# Patient Record
Sex: FEMALE | Race: WHITE | NOT HISPANIC OR LATINO
[De-identification: names, ages, dates, MRNs, and addresses within clinical notes are randomized per-mention and may not be internally consistent; named-entity substitution may affect disease eponyms.]

---

## 2020-03-01 ENCOUNTER — RESULT REVIEW (OUTPATIENT)
Age: 70
End: 2020-03-01

## 2020-03-01 ENCOUNTER — INPATIENT (INPATIENT)
Facility: HOSPITAL | Age: 70
LOS: 2 days | Discharge: HOME HEALTH | DRG: 25 | End: 2020-03-04
Attending: NEUROLOGICAL SURGERY | Admitting: NEUROLOGICAL SURGERY
Payer: MEDICARE

## 2020-03-01 VITALS
SYSTOLIC BLOOD PRESSURE: 146 MMHG | TEMPERATURE: 99 F | HEIGHT: 65 IN | WEIGHT: 160.06 LBS | OXYGEN SATURATION: 98 % | DIASTOLIC BLOOD PRESSURE: 85 MMHG | HEART RATE: 77 BPM | RESPIRATION RATE: 16 BRPM

## 2020-03-01 DIAGNOSIS — D72.829 ELEVATED WHITE BLOOD CELL COUNT, UNSPECIFIED: ICD-10-CM

## 2020-03-01 DIAGNOSIS — I62.02 NONTRAUMATIC SUBACUTE SUBDURAL HEMORRHAGE: ICD-10-CM

## 2020-03-01 DIAGNOSIS — Z85.3 PERSONAL HISTORY OF MALIGNANT NEOPLASM OF BREAST: ICD-10-CM

## 2020-03-01 DIAGNOSIS — Z88.0 ALLERGY STATUS TO PENICILLIN: ICD-10-CM

## 2020-03-01 DIAGNOSIS — Z98.89 OTHER SPECIFIED POSTPROCEDURAL STATES: Chronic | ICD-10-CM

## 2020-03-01 DIAGNOSIS — G93.6 CEREBRAL EDEMA: ICD-10-CM

## 2020-03-01 DIAGNOSIS — G81.91 HEMIPLEGIA, UNSPECIFIED AFFECTING RIGHT DOMINANT SIDE: ICD-10-CM

## 2020-03-01 DIAGNOSIS — E89.2 POSTPROCEDURAL HYPOPARATHYROIDISM: ICD-10-CM

## 2020-03-01 DIAGNOSIS — Z98.890 OTHER SPECIFIED POSTPROCEDURAL STATES: Chronic | ICD-10-CM

## 2020-03-01 DIAGNOSIS — G93.5 COMPRESSION OF BRAIN: ICD-10-CM

## 2020-03-01 LAB
BASOPHILS # BLD AUTO: 0.05 K/UL — SIGNIFICANT CHANGE UP (ref 0–0.2)
BASOPHILS NFR BLD AUTO: 0.4 % — SIGNIFICANT CHANGE UP (ref 0–2)
EOSINOPHIL # BLD AUTO: 0.02 K/UL — SIGNIFICANT CHANGE UP (ref 0–0.5)
EOSINOPHIL NFR BLD AUTO: 0.2 % — SIGNIFICANT CHANGE UP (ref 0–6)
HCT VFR BLD CALC: 41 % — SIGNIFICANT CHANGE UP (ref 34.5–45)
HGB BLD-MCNC: 13.3 G/DL — SIGNIFICANT CHANGE UP (ref 11.5–15.5)
IMM GRANULOCYTES NFR BLD AUTO: 0.5 % — SIGNIFICANT CHANGE UP (ref 0–1.5)
LYMPHOCYTES # BLD AUTO: 1.25 K/UL — SIGNIFICANT CHANGE UP (ref 1–3.3)
LYMPHOCYTES # BLD AUTO: 10.9 % — LOW (ref 13–44)
MCHC RBC-ENTMCNC: 29.1 PG — SIGNIFICANT CHANGE UP (ref 27–34)
MCHC RBC-ENTMCNC: 32.4 GM/DL — SIGNIFICANT CHANGE UP (ref 32–36)
MCV RBC AUTO: 89.7 FL — SIGNIFICANT CHANGE UP (ref 80–100)
MONOCYTES # BLD AUTO: 0.76 K/UL — SIGNIFICANT CHANGE UP (ref 0–0.9)
MONOCYTES NFR BLD AUTO: 6.6 % — SIGNIFICANT CHANGE UP (ref 2–14)
NEUTROPHILS # BLD AUTO: 9.37 K/UL — HIGH (ref 1.8–7.4)
NEUTROPHILS NFR BLD AUTO: 81.4 % — HIGH (ref 43–77)
PLATELET # BLD AUTO: 367 K/UL — SIGNIFICANT CHANGE UP (ref 150–400)
RBC # BLD: 4.57 M/UL — SIGNIFICANT CHANGE UP (ref 3.8–5.2)
RBC # FLD: 13.2 % — SIGNIFICANT CHANGE UP (ref 10.3–14.5)
WBC # BLD: 11.51 K/UL — HIGH (ref 3.8–10.5)
WBC # FLD AUTO: 11.51 K/UL — HIGH (ref 3.8–10.5)

## 2020-03-01 PROCEDURE — 71045 X-RAY EXAM CHEST 1 VIEW: CPT | Mod: 26

## 2020-03-01 PROCEDURE — 70496 CT ANGIOGRAPHY HEAD: CPT | Mod: 26

## 2020-03-01 PROCEDURE — 70498 CT ANGIOGRAPHY NECK: CPT | Mod: 26

## 2020-03-01 PROCEDURE — 93010 ELECTROCARDIOGRAM REPORT: CPT

## 2020-03-01 RX ORDER — LABETALOL HCL 100 MG
10 TABLET ORAL ONCE
Refills: 0 | Status: COMPLETED | OUTPATIENT
Start: 2020-03-01 | End: 2020-03-01

## 2020-03-01 RX ADMIN — Medication 10 MILLIGRAM(S): at 23:34

## 2020-03-01 NOTE — ED PROVIDER NOTE - IMAGING STUDIES ADDITIONAL INFORMATION FREE TEXT
Discussed imaging with radiologist and he is able to evaluate C spine from CTA neck; no evidence of fracture.

## 2020-03-01 NOTE — ED PROVIDER NOTE - CLINICAL SUMMARY MEDICAL DECISION MAKING FREE TEXT BOX
Subacute subdural hemorrhage with acute components now with shift and right sided weakness from subuncal herniation.  Labetalol given IV to control pressures;  patient admitted to ICU under neurosurgery with plan to go to OR.

## 2020-03-01 NOTE — ED PROVIDER NOTE - ATTENDING CONTRIBUTION TO CARE
Attending Contribution to Care: I, Sultana Wynn, performed the initial face to face bedside interview with this patient regarding history of present illness, review of symptoms and relevant past medical, social and family history.  I completed an independent physical examination.  I was the initial provider who evaluated this patient. I have signed out the follow up of any pending tests (i.e. labs, radiological studies) to the ACP.  I have communicated the patient’s plan of care and disposition with the ACP.

## 2020-03-01 NOTE — ED PROVIDER NOTE - OBJECTIVE STATEMENT
PA note: Patient is a 69 year old female with PMHx of chronic sinusitis s/p sinus surgery in Dr. [facility] 10 days ago presents with sudden onset of right sided weakness for 1 day. Patient lives alone at home. Patient's sister tried to call her for 7 hours today but patient did not  her phone. Sister went to her house to check on her and found patient to be leaned over on her right side, non-verbal, laying on a recliner chair. Time of onset is unknown. Patient is not able to give history, obtained from sister at bedside. ~CARLITO Griffith PA note: Patient is a 69 year old female with PMHx of chronic sinusitis s/p sinus surgery in Dr[facility] 10 days ago presents with sudden onset of right sided weakness for 1 day. Patient lives alone at home. Patient's sister tried to call her for 7 hours today but patient did not  her phone. Sister went to her house to check on her and found patient to be leaned over on her right side, non-verbal, laying on a recliner chair. Time of onset is unknown. Patient is not able to give history, obtained from sister at bedside. ~CARLITO Griffith    Attending Note; Pt. is a 70 yo F with a hx of sinus issues, peptic ulcers, glaucoma, hx of parathyroidectomy, hysterectomy, breast cancer with lumpectomy X 2 presents BIB ambulance for AMS.  Sister states she last spoke to her yesterday.  Patients sister could not get in touch with her for about 7 hours today and went to her home.  Patient was slumped to the right, not speaking much and could move her right side very little.  Family called 911 as they suspected a stroke. Per sister, she spent time with her this week and patient also mentioned she fell while working in her barn about a week ago.  She was complaining of a dull headache this week.

## 2020-03-01 NOTE — ED ADULT TRIAGE NOTE - CHIEF COMPLAINT QUOTE
Pt brought in by EMS and sister for new AMS and weakness. Pt was last spoken to yesterday around 1400 by sister and was known to be well. Pt currently AOX1, Alert but confused. New weakness to right side. Pt does live alone. Sister notes she had sinus surgery last week. No code stroke called per Tianna Pt brought in by EMS and sister for new AMS and weakness. Pt was last spoken to yesterday around 1400 by sister and was known to be well. Pt currently AOX1, Alert but confused. New weakness to right side. Pt does live alone. Sister notes she had sinus surgery last week. No code stroke called per Ramos

## 2020-03-01 NOTE — ED PROVIDER NOTE - PROGRESS NOTE DETAILS
PA note: Due to the urgency of patient's S&S of an acute CVA, lab results were waived in order to expedite CTA head and neck. ~CARLITO Griffith JG:  PA discussed patient with me and CT showing bleed with shift.  Neurosurg PA contacted and Dr. Cisneros on for neurosurgery.  Labetalol given. JG:  CARLITO Calderon discussed patient with me and CT showing bleed with shift.  Neurosurg PA contacted and Dr. Cisneros on for neurosurgery.  Labetalol given. PA note, code stroke was canceled by charge nurse who told me that it was canceled by Dr. Ramos. I ordered stroke panel immediately after examining the patient. ~CARLITO Griffith PA note: Patient appears to have profound weakness on right side of her body. CT head shows right subacute subdural hematoma with right-to-left shift. ~CARLITO Griffith PA note: Due to the urgency of patient's S&S of an acute CVA, lab results were waived in order to expedite CTA head and neck. I personally called CT tech to immediately take patient to CT to r/o acute CVA. ~Kvng Ali, PA PA note: code stroke was canceled by charge nurse who told me that it was canceled by Dr. Ramos. I ordered stroke panel immediately after examining the patient. ~CARLITO Griffith

## 2020-03-01 NOTE — ED ADULT NURSE NOTE - OBJECTIVE STATEMENT
as above- sister also states that pt fell accidentally last week while in the barn after the sinus surgery- unknown if hit her head

## 2020-03-01 NOTE — ED PROVIDER NOTE - HEME LYMPH, MLM
No adenopathy or splenomegaly. No cervical or inguinal lymphadenopathy. No adenopathy or splenomegaly. No cervical or inguinal lymphadenopathy. ~CARLITO Griffith

## 2020-03-01 NOTE — ED PROVIDER NOTE - CPE EDP NEURO NORM
Before Your Surgery      Call your surgeon if there is any change in your health. This includes signs of a cold or flu (such as a sore throat, runny nose, cough, rash or fever).    Do not smoke, drink alcohol or take over the counter medicine (unless your surgeon or primary care doctor tells you to) for the 24 hours before and after surgery.    If you take prescribed drugs: Follow your doctor s orders about which medicines to take and which to stop until after surgery.    Eating and drinking prior to surgery: follow the instructions from your surgeon    Take a shower or bath the night before surgery. Use the soap your surgeon gave you to gently clean your skin. If you do not have soap from your surgeon, use your regular soap. Do not shave or scrub the surgery site.  Wear clean pajamas and have clean sheets on your bed.    normal... - - -

## 2020-03-01 NOTE — ED PROVIDER NOTE - ENMT, MLM
Airway patent, Nasal mucosa clear. Mouth with normal mucosa. Throat has no vesicles, no oropharyngeal exudates and uvula is midline. Airway patent, Nasal mucosa clear. Mouth with normal mucosa. Throat is clear

## 2020-03-01 NOTE — ED ADULT NURSE NOTE - CHIEF COMPLAINT QUOTE
Pt brought in by EMS and sister for new AMS and weakness. Pt was last spoken to yesterday around 1400 by sister and was known to be well. Pt currently AOX1, Alert but confused. New weakness to right side. Pt does live alone. Sister notes she had sinus surgery last week. No code stroke called per Tianna

## 2020-03-01 NOTE — ED ADULT NURSE REASSESSMENT NOTE - NS ED NURSE REASSESS COMMENT FT1
BIBA for r/o stroke- pt last seen normal by family yesterday at 2pm- recently had sinus surgery 9 days ago. Pt arrived nonverbal, slight movements to BUE and +reflex to both feet. Pt p;aced pn CM, SR on EKG, pt taken to CT. Family at bedside.

## 2020-03-01 NOTE — ED PROVIDER NOTE - UNABLE TO OBTAIN
Severe Illness/Injury Patient with signs of CVA/TIA, time of onset unknown. History obtained from sister.

## 2020-03-02 DIAGNOSIS — I62.00 NONTRAUMATIC SUBDURAL HEMORRHAGE, UNSPECIFIED: ICD-10-CM

## 2020-03-02 LAB
ALBUMIN SERPL ELPH-MCNC: 3.4 G/DL — SIGNIFICANT CHANGE UP (ref 3.3–5)
ALP SERPL-CCNC: 88 U/L — SIGNIFICANT CHANGE UP (ref 40–120)
ALT FLD-CCNC: 18 U/L — SIGNIFICANT CHANGE UP (ref 12–78)
ANION GAP SERPL CALC-SCNC: 5 MMOL/L — SIGNIFICANT CHANGE UP (ref 5–17)
ANION GAP SERPL CALC-SCNC: 6 MMOL/L — SIGNIFICANT CHANGE UP (ref 5–17)
APTT BLD: 30.8 SEC — SIGNIFICANT CHANGE UP (ref 27.5–36.3)
AST SERPL-CCNC: 10 U/L — LOW (ref 15–37)
BILIRUB SERPL-MCNC: 1.2 MG/DL — SIGNIFICANT CHANGE UP (ref 0.2–1.2)
BUN SERPL-MCNC: 15 MG/DL — SIGNIFICANT CHANGE UP (ref 7–23)
BUN SERPL-MCNC: 16 MG/DL — SIGNIFICANT CHANGE UP (ref 7–23)
CALCIUM SERPL-MCNC: 8.4 MG/DL — LOW (ref 8.5–10.1)
CALCIUM SERPL-MCNC: 8.7 MG/DL — SIGNIFICANT CHANGE UP (ref 8.5–10.1)
CHLORIDE SERPL-SCNC: 105 MMOL/L — SIGNIFICANT CHANGE UP (ref 96–108)
CHLORIDE SERPL-SCNC: 108 MMOL/L — SIGNIFICANT CHANGE UP (ref 96–108)
CO2 SERPL-SCNC: 26 MMOL/L — SIGNIFICANT CHANGE UP (ref 22–31)
CO2 SERPL-SCNC: 26 MMOL/L — SIGNIFICANT CHANGE UP (ref 22–31)
CREAT SERPL-MCNC: 0.59 MG/DL — SIGNIFICANT CHANGE UP (ref 0.5–1.3)
CREAT SERPL-MCNC: 0.66 MG/DL — SIGNIFICANT CHANGE UP (ref 0.5–1.3)
GLUCOSE SERPL-MCNC: 115 MG/DL — HIGH (ref 70–99)
GLUCOSE SERPL-MCNC: 120 MG/DL — HIGH (ref 70–99)
HBA1C BLD-MCNC: 5.6 % — SIGNIFICANT CHANGE UP (ref 4–5.6)
HCT VFR BLD CALC: 40.2 % — SIGNIFICANT CHANGE UP (ref 34.5–45)
HCV AB S/CO SERPL IA: 0.19 S/CO — SIGNIFICANT CHANGE UP (ref 0–0.99)
HCV AB SERPL-IMP: SIGNIFICANT CHANGE UP
HGB BLD-MCNC: 12.9 G/DL — SIGNIFICANT CHANGE UP (ref 11.5–15.5)
INR BLD: 1.08 RATIO — SIGNIFICANT CHANGE UP (ref 0.88–1.16)
LACTATE SERPL-SCNC: 1.1 MMOL/L — SIGNIFICANT CHANGE UP (ref 0.7–2)
MAGNESIUM SERPL-MCNC: 2.1 MG/DL — SIGNIFICANT CHANGE UP (ref 1.6–2.6)
MCHC RBC-ENTMCNC: 29.3 PG — SIGNIFICANT CHANGE UP (ref 27–34)
MCHC RBC-ENTMCNC: 32.1 GM/DL — SIGNIFICANT CHANGE UP (ref 32–36)
MCV RBC AUTO: 91.2 FL — SIGNIFICANT CHANGE UP (ref 80–100)
PHOSPHATE SERPL-MCNC: 3.4 MG/DL — SIGNIFICANT CHANGE UP (ref 2.5–4.5)
PLATELET # BLD AUTO: 345 K/UL — SIGNIFICANT CHANGE UP (ref 150–400)
POTASSIUM SERPL-MCNC: 3.6 MMOL/L — SIGNIFICANT CHANGE UP (ref 3.5–5.3)
POTASSIUM SERPL-MCNC: 3.9 MMOL/L — SIGNIFICANT CHANGE UP (ref 3.5–5.3)
POTASSIUM SERPL-SCNC: 3.6 MMOL/L — SIGNIFICANT CHANGE UP (ref 3.5–5.3)
POTASSIUM SERPL-SCNC: 3.9 MMOL/L — SIGNIFICANT CHANGE UP (ref 3.5–5.3)
PROT SERPL-MCNC: 6.9 GM/DL — SIGNIFICANT CHANGE UP (ref 6–8.3)
PROTHROM AB SERPL-ACNC: 12 SEC — SIGNIFICANT CHANGE UP (ref 10–12.9)
RBC # BLD: 4.41 M/UL — SIGNIFICANT CHANGE UP (ref 3.8–5.2)
RBC # FLD: 13.3 % — SIGNIFICANT CHANGE UP (ref 10.3–14.5)
SODIUM SERPL-SCNC: 137 MMOL/L — SIGNIFICANT CHANGE UP (ref 135–145)
SODIUM SERPL-SCNC: 139 MMOL/L — SIGNIFICANT CHANGE UP (ref 135–145)
TROPONIN I SERPL-MCNC: <0.015 NG/ML — SIGNIFICANT CHANGE UP (ref 0.01–0.04)
TSH SERPL-MCNC: 1.09 UU/ML — SIGNIFICANT CHANGE UP (ref 0.34–4.82)
WBC # BLD: 13.88 K/UL — HIGH (ref 3.8–10.5)
WBC # FLD AUTO: 13.88 K/UL — HIGH (ref 3.8–10.5)

## 2020-03-02 PROCEDURE — 99291 CRITICAL CARE FIRST HOUR: CPT

## 2020-03-02 PROCEDURE — C1713: CPT

## 2020-03-02 PROCEDURE — 83735 ASSAY OF MAGNESIUM: CPT

## 2020-03-02 PROCEDURE — 83036 HEMOGLOBIN GLYCOSYLATED A1C: CPT

## 2020-03-02 PROCEDURE — 70450 CT HEAD/BRAIN W/O DYE: CPT | Mod: 26

## 2020-03-02 PROCEDURE — 86920 COMPATIBILITY TEST SPIN: CPT

## 2020-03-02 PROCEDURE — 84100 ASSAY OF PHOSPHORUS: CPT

## 2020-03-02 PROCEDURE — 72125 CT NECK SPINE W/O DYE: CPT

## 2020-03-02 PROCEDURE — 92610 EVALUATE SWALLOWING FUNCTION: CPT | Mod: GN

## 2020-03-02 PROCEDURE — 70450 CT HEAD/BRAIN W/O DYE: CPT

## 2020-03-02 PROCEDURE — 93005 ELECTROCARDIOGRAM TRACING: CPT

## 2020-03-02 PROCEDURE — 92523 SPEECH SOUND LANG COMPREHEN: CPT | Mod: GN

## 2020-03-02 PROCEDURE — 97116 GAIT TRAINING THERAPY: CPT | Mod: GP

## 2020-03-02 PROCEDURE — 84443 ASSAY THYROID STIM HORMONE: CPT

## 2020-03-02 PROCEDURE — 85027 COMPLETE CBC AUTOMATED: CPT

## 2020-03-02 PROCEDURE — C1763: CPT

## 2020-03-02 PROCEDURE — 80048 BASIC METABOLIC PNL TOTAL CA: CPT

## 2020-03-02 PROCEDURE — 88304 TISSUE EXAM BY PATHOLOGIST: CPT

## 2020-03-02 PROCEDURE — 88304 TISSUE EXAM BY PATHOLOGIST: CPT | Mod: 26

## 2020-03-02 PROCEDURE — 72125 CT NECK SPINE W/O DYE: CPT | Mod: 26

## 2020-03-02 PROCEDURE — 86803 HEPATITIS C AB TEST: CPT

## 2020-03-02 PROCEDURE — 93010 ELECTROCARDIOGRAM REPORT: CPT

## 2020-03-02 PROCEDURE — 36415 COLL VENOUS BLD VENIPUNCTURE: CPT

## 2020-03-02 PROCEDURE — 97162 PT EVAL MOD COMPLEX 30 MIN: CPT | Mod: GP

## 2020-03-02 PROCEDURE — 97530 THERAPEUTIC ACTIVITIES: CPT | Mod: GP

## 2020-03-02 RX ORDER — FOLIC ACID 0.8 MG
1 TABLET ORAL DAILY
Refills: 0 | Status: DISCONTINUED | OUTPATIENT
Start: 2020-03-02 | End: 2020-03-04

## 2020-03-02 RX ORDER — SODIUM CHLORIDE 9 MG/ML
1000 INJECTION INTRAMUSCULAR; INTRAVENOUS; SUBCUTANEOUS
Refills: 0 | Status: DISCONTINUED | OUTPATIENT
Start: 2020-03-02 | End: 2020-03-02

## 2020-03-02 RX ORDER — ACETAMINOPHEN 500 MG
1000 TABLET ORAL EVERY 8 HOURS
Refills: 0 | Status: COMPLETED | OUTPATIENT
Start: 2020-03-02 | End: 2020-03-02

## 2020-03-02 RX ORDER — VANCOMYCIN HCL 1 G
750 VIAL (EA) INTRAVENOUS EVERY 12 HOURS
Refills: 0 | Status: DISCONTINUED | OUTPATIENT
Start: 2020-03-02 | End: 2020-03-03

## 2020-03-02 RX ORDER — LABETALOL HCL 100 MG
10 TABLET ORAL
Refills: 0 | Status: DISCONTINUED | OUTPATIENT
Start: 2020-03-02 | End: 2020-03-04

## 2020-03-02 RX ORDER — LEVETIRACETAM 250 MG/1
500 TABLET, FILM COATED ORAL ONCE
Refills: 0 | Status: DISCONTINUED | OUTPATIENT
Start: 2020-03-02 | End: 2020-03-02

## 2020-03-02 RX ORDER — VANCOMYCIN HCL 1 G
VIAL (EA) INTRAVENOUS
Refills: 0 | Status: DISCONTINUED | OUTPATIENT
Start: 2020-03-02 | End: 2020-03-02

## 2020-03-02 RX ORDER — LEVETIRACETAM 250 MG/1
500 TABLET, FILM COATED ORAL EVERY 12 HOURS
Refills: 0 | Status: DISCONTINUED | OUTPATIENT
Start: 2020-03-02 | End: 2020-03-02

## 2020-03-02 RX ORDER — VANCOMYCIN HCL 1 G
1000 VIAL (EA) INTRAVENOUS EVERY 12 HOURS
Refills: 0 | Status: DISCONTINUED | OUTPATIENT
Start: 2020-03-02 | End: 2020-03-02

## 2020-03-02 RX ORDER — POTASSIUM CHLORIDE 20 MEQ
10 PACKET (EA) ORAL
Refills: 0 | Status: COMPLETED | OUTPATIENT
Start: 2020-03-02 | End: 2020-03-02

## 2020-03-02 RX ORDER — FENTANYL CITRATE 50 UG/ML
25 INJECTION INTRAVENOUS
Refills: 0 | Status: DISCONTINUED | OUTPATIENT
Start: 2020-03-02 | End: 2020-03-03

## 2020-03-02 RX ORDER — SENNA PLUS 8.6 MG/1
2 TABLET ORAL AT BEDTIME
Refills: 0 | Status: DISCONTINUED | OUTPATIENT
Start: 2020-03-02 | End: 2020-03-04

## 2020-03-02 RX ORDER — PANTOPRAZOLE SODIUM 20 MG/1
40 TABLET, DELAYED RELEASE ORAL
Refills: 0 | Status: DISCONTINUED | OUTPATIENT
Start: 2020-03-02 | End: 2020-03-04

## 2020-03-02 RX ORDER — FENTANYL CITRATE 50 UG/ML
25 INJECTION INTRAVENOUS
Refills: 0 | Status: DISCONTINUED | OUTPATIENT
Start: 2020-03-02 | End: 2020-03-02

## 2020-03-02 RX ORDER — OXYCODONE HYDROCHLORIDE 5 MG/1
10 TABLET ORAL EVERY 4 HOURS
Refills: 0 | Status: DISCONTINUED | OUTPATIENT
Start: 2020-03-02 | End: 2020-03-04

## 2020-03-02 RX ORDER — VANCOMYCIN HCL 1 G
1000 VIAL (EA) INTRAVENOUS ONCE
Refills: 0 | Status: DISCONTINUED | OUTPATIENT
Start: 2020-03-02 | End: 2020-03-02

## 2020-03-02 RX ORDER — LEVETIRACETAM 250 MG/1
750 TABLET, FILM COATED ORAL EVERY 12 HOURS
Refills: 0 | Status: DISCONTINUED | OUTPATIENT
Start: 2020-03-02 | End: 2020-03-02

## 2020-03-02 RX ORDER — POLYETHYLENE GLYCOL 3350 17 G/17G
17 POWDER, FOR SOLUTION ORAL DAILY
Refills: 0 | Status: DISCONTINUED | OUTPATIENT
Start: 2020-03-02 | End: 2020-03-04

## 2020-03-02 RX ORDER — LABETALOL HCL 100 MG
10 TABLET ORAL
Refills: 0 | Status: DISCONTINUED | OUTPATIENT
Start: 2020-03-02 | End: 2020-03-02

## 2020-03-02 RX ORDER — ONDANSETRON 8 MG/1
4 TABLET, FILM COATED ORAL ONCE
Refills: 0 | Status: DISCONTINUED | OUTPATIENT
Start: 2020-03-02 | End: 2020-03-04

## 2020-03-02 RX ORDER — ACETAMINOPHEN 500 MG
650 TABLET ORAL EVERY 6 HOURS
Refills: 0 | Status: DISCONTINUED | OUTPATIENT
Start: 2020-03-02 | End: 2020-03-04

## 2020-03-02 RX ORDER — ACETAMINOPHEN 500 MG
1000 TABLET ORAL ONCE
Refills: 0 | Status: DISCONTINUED | OUTPATIENT
Start: 2020-03-02 | End: 2020-03-04

## 2020-03-02 RX ORDER — ONDANSETRON 8 MG/1
4 TABLET, FILM COATED ORAL EVERY 4 HOURS
Refills: 0 | Status: DISCONTINUED | OUTPATIENT
Start: 2020-03-02 | End: 2020-03-04

## 2020-03-02 RX ORDER — ACETAMINOPHEN 500 MG
1000 TABLET ORAL ONCE
Refills: 0 | Status: COMPLETED | OUTPATIENT
Start: 2020-03-02 | End: 2020-03-02

## 2020-03-02 RX ORDER — LEVETIRACETAM 250 MG/1
750 TABLET, FILM COATED ORAL EVERY 12 HOURS
Refills: 0 | Status: DISCONTINUED | OUTPATIENT
Start: 2020-03-02 | End: 2020-03-03

## 2020-03-02 RX ADMIN — LEVETIRACETAM 400 MILLIGRAM(S): 250 TABLET, FILM COATED ORAL at 22:13

## 2020-03-02 RX ADMIN — Medication 10 MILLIGRAM(S): at 00:33

## 2020-03-02 RX ADMIN — Medication 250 MILLIGRAM(S): at 23:16

## 2020-03-02 RX ADMIN — Medication 650 MILLIGRAM(S): at 22:34

## 2020-03-02 RX ADMIN — SENNA PLUS 2 TABLET(S): 8.6 TABLET ORAL at 22:12

## 2020-03-02 RX ADMIN — Medication 1 TABLET(S): at 13:43

## 2020-03-02 RX ADMIN — LEVETIRACETAM 400 MILLIGRAM(S): 250 TABLET, FILM COATED ORAL at 10:44

## 2020-03-02 RX ADMIN — FENTANYL CITRATE 25 MICROGRAM(S): 50 INJECTION INTRAVENOUS at 15:10

## 2020-03-02 RX ADMIN — Medication 1 MILLIGRAM(S): at 13:43

## 2020-03-02 RX ADMIN — POLYETHYLENE GLYCOL 3350 17 GRAM(S): 17 POWDER, FOR SOLUTION ORAL at 13:43

## 2020-03-02 RX ADMIN — Medication 650 MILLIGRAM(S): at 23:00

## 2020-03-02 RX ADMIN — SODIUM CHLORIDE 75 MILLILITER(S): 9 INJECTION INTRAMUSCULAR; INTRAVENOUS; SUBCUTANEOUS at 04:36

## 2020-03-02 RX ADMIN — Medication 1000 MILLIGRAM(S): at 10:42

## 2020-03-02 RX ADMIN — Medication 250 MILLIGRAM(S): at 12:02

## 2020-03-02 RX ADMIN — LEVETIRACETAM 400 MILLIGRAM(S): 250 TABLET, FILM COATED ORAL at 00:52

## 2020-03-02 RX ADMIN — FENTANYL CITRATE 25 MICROGRAM(S): 50 INJECTION INTRAVENOUS at 14:53

## 2020-03-02 RX ADMIN — Medication 1000 MILLIGRAM(S): at 06:40

## 2020-03-02 RX ADMIN — Medication 100 MILLIEQUIVALENT(S): at 04:37

## 2020-03-02 RX ADMIN — Medication 400 MILLIGRAM(S): at 09:52

## 2020-03-02 RX ADMIN — Medication 400 MILLIGRAM(S): at 05:33

## 2020-03-02 NOTE — SWALLOW BEDSIDE ASSESSMENT ADULT - COMMENTS
The pt was found at home slumped in a chair in a poorly responsive state. Imaging of brain in hospital is notable for a right SDH with shift and she is status post evacuation/craniotomy. Patient has been alert post surgery. She has an underlying history of recently undergoing sinus surgery, peptic ulcer disease, HLD, glaucoma, prior parathyroidectomy, past breast cancer s/p 2 lumpectomies, and previous hysterectomy. The pt was admitted to  after being found at home slumped in a chair in a poorly responsive state. Imaging of brain in hospital is notable for a right SDH with shift and she is status post evacuation/craniotomy. Patient has been alert post surgery. She has an underlying history of recently undergoing sinus surgery, peptic ulcer disease, HLD, glaucoma, prior parathyroidectomy, past breast cancer s/p 2 lumpectomies, and previous hysterectomy.

## 2020-03-02 NOTE — H&P ADULT - HISTORY OF PRESENT ILLNESS
Neurosurgery:    69F with PMHx of  sinus issues, peptic ulcers, glaucoma, hx of parathyroidectomy, hysterectomy, breast cancer with lumpectomy X 2 presents BIB ambulance for AMS and weakness / slumping over in chair when found in her home.  Pt is s/p sinus surgery in Dr. [facility] 10 days ago - family reports possible fall post ENT surgery but unsure.  Patient lives alone at home. Patient's sister tried to call her for 7 hours today but patient did not  her phone. Sister went to her house to check on her at 10pm and found patient to be leaned over on her right side, non-verbal, laying on a recliner chair. Time of onset is unknown. Patient is not able to give history, obtained from sister at bedside. Family called 911 as they suspected a stroke. Per sister, she spent time with her this week and patient also mentioned she fell while working in her barn about a week ago.  She was complaining of a dull headache this week.  CTH revealed Right SDH subacute in imaging characteristics.  Family denies blood thinner use or NSAID / ASA use.      PAST MEDICAL & SURGICAL HISTORY:  Sinus disease  S/P sinus surgery    Allergies:  penicillin (Unknown)  penicillins (Unknown)      MEDICATIONS  (STANDING):  levETIRAcetam 500 milliGRAM(s) Oral every 12 hours  potassium chloride  10 mEq/100 mL IVPB 10 milliEquivalent(s) IV Intermittent every 1 hour  sodium chloride 0.9%. 1000 milliLiter(s) (75 mL/Hr) IV Continuous <Continuous> Total Critical Care Time spent > 62 minutes in evaluating patient, medical record, imaging studies and implementing neuro protective measures to avoid further neurological insult, injury or collapse.  All above recommendations will potentially avoid hemorrhage expansion  / brain compression / cerebral edema / and or eliptogenic activity.      Neurosurgery:    HPI    69F with PMHx of  sinus issues, peptic ulcers, glaucoma, hx of parathyroidectomy, hysterectomy, breast cancer with lumpectomy X 2 presents BIB ambulance for AMS and weakness / slumping over in chair when found in her home.  Pt is s/p sinus surgery in Dr. [facility] 10 days ago - family reports possible fall post ENT surgery but unsure.  Patient lives alone at home. Patient's sister tried to call her for 7 hours today but patient did not  her phone. Sister went to her house to check on her at 10pm and found patient to be leaned over on her right side, non-verbal, laying on a recliner chair. Time of onset is unknown. Patient is not able to give history, obtained from sister at bedside. Family called 911 as they suspected a stroke. Per sister, she spent time with her this week and patient also mentioned she fell while working in her barn about a week ago.  She was complaining of a dull headache this week.  CTH revealed Right SDH subacute in imaging characteristics.  Family denies blood thinner use or NSAID / ASA use.      PAST MEDICAL & SURGICAL HISTORY:  Sinus disease  S/P sinus surgery    Allergies:  penicillin (Unknown)  penicillins (Unknown)      MEDICATIONS  (STANDING):  levETIRAcetam 500 milliGRAM(s) Oral every 12 hours  potassium chloride  10 mEq/100 mL IVPB 10 milliEquivalent(s) IV Intermittent every 1 hour  sodium chloride 0.9%. 1000 milliLiter(s) (75 mL/Hr) IV Continuous <Continuous>

## 2020-03-02 NOTE — CHART NOTE - NSCHARTNOTEFT_GEN_A_CORE
CAPRINI SCORE [CLOT]    Patient has an estimated Caprini Score of greater than 5.  However, the patient's unique clinical situation will be addressed in an individual manner to determine appropriate anticoagulation treatment, if any.  At this time no chemical DVT prophylaxis. Mechancial DVT prophylaxis is permitted.    AGE RELATED RISK FACTORS                                                       MOBILITY RELATED FACTORS  [ ] Age 41-60 years                                            (1 Point)                  [ ] Bed rest                                                        (1 Point)  [ ] Age: 61-74 years                                           (2 Points)                 [ ] Plaster cast                                                   (2 Points)  [ ] Age= 75 years                                              (3 Points)                 [ ] Bed bound for more than 72 hours                 (2 Points)    DISEASE RELATED RISK FACTORS                                               GENDER SPECIFIC FACTORS  [ ] Edema in the lower extremities                       (1 Point)                  [ ] Pregnancy                                                     (1 Point)  [ ] Varicose veins                                               (1 Point)                  [ ] Post-partum < 6 weeks                                   (1 Point)             [ ] BMI > 25 Kg/m2                                            (1 Point)                  [ ] Hormonal therapy  or oral contraception          (1 Point)                 [ ] Sepsis (in the previous month)                        (1 Point)                  [ ] History of pregnancy complications                 (1 point)  [ ] Pneumonia or serious lung disease                                               [ ] Unexplained or recurrent                     (1 Point)           (in the previous month)                               (1 Point)  [ ] Abnormal pulmonary function test                     (1 Point)                 SURGERY RELATED RISK FACTORS  [ ] Acute myocardial infarction                              (1 Point)                 [ ]  Section                                             (1 Point)  [ ] Congestive heart failure (in the previous month)  (1 Point)               [ ] Minor surgery                                                  (1 Point)   [ ] Inflammatory bowel disease                             (1 Point)                 [ ] Arthroscopic surgery                                        (2 Points)  [ ] Central venous access                                      (2 Points)                [ ] General surgery lasting more than 45 minutes   (2 Points)       [ ] Stroke (in the previous month)                          (5 Points)               [ ] Elective arthroplasty                                         (5 Points)                                                                                                                                               HEMATOLOGY RELATED FACTORS                                                 TRAUMA RELATED RISK FACTORS  [ ] Prior episodes of VTE                                     (3 Points)                [ ] Fracture of the hip, pelvis, or leg                       (5 Points)  [ ] Positive family history for VTE                         (3 Points)                 [ ] Acute spinal cord injury (in the previous month)  (5 Points)  [ ] Prothrombin 58390 A                                     (3 Points)                 [ ] Paralysis  (less than 1 month)                             (5 Points)  [ ] Factor V Leiden                                             (3 Points)                  [ ] Multiple Trauma within 1 month                        (5 Points)  [ ] Lupus anticoagulants                                     (3 Points)                                                           [ ] Anticardiolipin antibodies                               (3 Points)                                                       [ ] High homocysteine in the blood                      (3 Points)                                             [ ] Other congenital or acquired thrombophilia      (3 Points)                                                [ ] Heparin induced thrombocytopenia                  (3 Points)                                          Caprini Score 0 - 2:  Low Risk, No VTE Prophylaxis required for most patients, encourage ambulation  Caprini Score 3 - 6:  At Risk, pharmacologic VTE prophylaxis is indicated for most patients (in the absence of a contraindication)  Caprini Score Greater than or = 7:  High Risk, pharmacologic VTE prophylaxis is indicated for most patients (in the absence of a contraindication)

## 2020-03-02 NOTE — BRIEF OPERATIVE NOTE - NSICDXBRIEFPROCEDURE_GEN_ALL_CORE_FT
PROCEDURES:  Craniotomy, emergent, for subdural hematoma evacuation 02-Mar-2020 04:43:25  Ciaran Vences

## 2020-03-02 NOTE — SWALLOW BEDSIDE ASSESSMENT ADULT - SWALLOW EVAL: RECOMMENDED DIET
SUGGEST A REGULAR TEXTURE DIET WITH THIN LIQUID CONSISTENCIES AS THE PATIENT TOLERATES THESE FOOD CONSISTENCIES FROM AN OROPHARYNGEAL SWALLOWING STANCE ON EXAM.

## 2020-03-02 NOTE — SWALLOW BEDSIDE ASSESSMENT ADULT - SWALLOW EVAL: DIAGNOSIS
1) Pt demonstrates functional Oropharyngeal Swallowing abilities for age without behavioral aspiration signs on exam.  2) Pt was alert and interactive. She was somewhat restless as well as impulsive at times. The patient was able to verbalize during communicative probes and in conversation. She was very sarcastic when verbalizing which her brother stated was typical. Her motor speech abilities were intact and her speech intelligibility was 100% when verbalizing.  Her underlying utterances were fluent, linguistically intact and contextually inappropriate. The pt was able to effectively verbalize her intents on exam.

## 2020-03-02 NOTE — PHARMACOTHERAPY INTERVENTION NOTE - COMMENTS
Completed medication history. Patient stated she does not take any medications other than Tylenol as needed. She is not certain what reaction she had to aspirin, she believes is was itching/ hives.

## 2020-03-02 NOTE — CONSULT NOTE ADULT - ASSESSMENT
69F s/p Fall with SDH, midline shift, Right sided weakness      NSX on board  Plan for OR  Cleared from trauma surgery standpoint  No acute trauma intervention  ICU after OR    d/w Dr Ingram

## 2020-03-02 NOTE — CONSULT NOTE ADULT - ASSESSMENT
69F with PMHx of  sinus issues, peptic ulcers, glaucoma, hx of parathyroidectomy, hysterectomy, breast cancer with lumpectomy X 2, recent sinus surgery admitted on 3/2 for evaluation of being found home slumped over; patient cannot recall details surrounding admission. Upon arrival patient was found to have right sided subdural hematoma and underwent emergency evacuation by neurosurgery. Notes headache but no other specific complaints. History per medical record as patient is poor historian.  1. Patient admitted with SDH and subsequently had operative decompression; also noted with leukocytosis most likely reactive to the bleed  - iv hydration and supportive care   - serial cbc and monitor temperature   - drain and dressing per neurosurgery  - on vancomycin surgical prophylaxis, stop after 2 doses  2. other issues; per medicine

## 2020-03-02 NOTE — SWALLOW BEDSIDE ASSESSMENT ADULT - SLP GENERAL OBSERVATIONS
On encounter, a surgical drain was in place coming from her head and was actively draining blood.  Pt was alert and interactive. She was somewhat restless as well as impulsive at times. The patient was able to verbalize during communicative probes and in conversation. She was very sarcastic when verbalizing which her brother stated was typical. Her motor speech abilities were intact and her speech intelligibility was 100% when verbalizing.  Her underlying utterances were fluent, linguistically intact and contextually inappropriate. The pt was able to effectively verbalize her intents on exam.

## 2020-03-02 NOTE — H&P ADULT - NSHPPHYSICALEXAM_GEN_ALL_CORE
Vital Signs Last 24 Hrs  T(C): 37.4 (01 Mar 2020 22:54), Max: 37.4 (01 Mar 2020 22:54)  T(F): 99.3 (01 Mar 2020 22:54), Max: 99.3 (01 Mar 2020 22:54)  HR: 90 (01 Mar 2020 23:35) (77 - 90)  BP: 149/91 (01 Mar 2020 23:35) (146/85 - 149/91)  BP(mean): --  RR: 15 (01 Mar 2020 23:35) (15 - 16)  SpO2: 99% (01 Mar 2020 23:35) (98% - 99%)    Physical Exam:  Constitutional: Awake / slow to respond but remains fairly attentive, not fully alert.  Pt is slow to respond, but no dysarthria.  Pt with no facial weakness  HEENT: PERRLA, EOMI  Neck: Supple  Respiratory: Breath sounds are clear bilaterally  Cardiovascular: S1 and S2, regular rhythm  Gastrointestinal: Soft, NT/ND  Extremities:  no edema  Musculoskeletal: no joint swelling/tenderness, no abnormal movements  Skin: No rashes    Neurological Exam:  HF: A x O x 1 ( Self, , not president) Pt is slow to respond, approaching sleepy state.  slow / dull inhibited affect.  speech steady and fluent, no aphasia or paraphasic errors. Naming /repetition intact   CN: PERRL, EOMI, VFF, facial sensation normal, no NLFD, tongue midline  Motor: right side Antigravity < L side.  RUE 3+/5, RLE 4-/5.  LUE 4-/5, LLE 4/5  Sens: Intact to light touch  Reflexes: Symmetric and normal, downgoing toes b/l  Coord:  SOLA (unable to assess with UE's, LE's slow no dysmetria)  Gait/Balance: Cannot test

## 2020-03-02 NOTE — PATIENT PROFILE ADULT - VISION (WITH CORRECTIVE LENSES IF THE PATIENT USUALLY WEARS THEM):
unable to assess at this time due to lethargy Normal vision: sees adequately in most situations; can see medication labels, newsprint

## 2020-03-02 NOTE — PROGRESS NOTE ADULT - ASSESSMENT
69F with pmHx of Peptic ulcer, Parathyroid surgery, recent ENT sinus surgery about 10 days ago p/w change in MS and R subacute SDH.    Now s/p Right frontal crani for evacuation of SDH    - Advance diet  - D/c Knott  - OOB to chair  - Cont Keppra  - Cont LISA for now, Vanco x 2 for drain ppx  - IV Tylenol for pain (refusing opioids)  - SBP goal <140  - Venodynes    Discussed with Dr La

## 2020-03-02 NOTE — CONSULT NOTE ADULT - SUBJECTIVE AND OBJECTIVE BOX
69F with PMHx of  sinus issues, peptic ulcers, glaucoma, hx of parathyroidectomy, hysterectomy, breast cancer with lumpectomy X 2, recent sinus surgery admitted on 3/2 for evaluation of being found home slumped over; patient cannot recall details surrounding admission. Upon arrival patient was found to have right sided subdural hematoma and underwent emergency evacuation by neurosurgery. Notes headache but no other specific complaints. History per medical record as patient is poor historian.          PMH: as above  PSH: as above  Meds: per reconciliation sheet, noted below  MEDICATIONS  (STANDING):  acetaminophen  IVPB .. 1000 milliGRAM(s) IV Intermittent once  folic acid 1 milliGRAM(s) Oral daily  levETIRAcetam  IVPB 750 milliGRAM(s) IV Intermittent every 12 hours  multivitamin 1 Tablet(s) Oral daily  pantoprazole    Tablet 40 milliGRAM(s) Oral before breakfast  polyethylene glycol 3350 17 Gram(s) Oral daily  senna 2 Tablet(s) Oral at bedtime  vancomycin  IVPB 750 milliGRAM(s) IV Intermittent every 12 hours    MEDICATIONS  (PRN):  acetaminophen   Tablet .. 650 milliGRAM(s) Oral every 6 hours PRN Temp greater or equal to 38C (100.4F), Mild Pain (1 - 3)  fentaNYL    Injectable 25 MICROGram(s) IV Push every 30 minutes PRN Severe Pain (7 - 10)  labetalol Injectable 10 milliGRAM(s) IV Push every 1 hour PRN Systolic blood pressure >  ondansetron Injectable 4 milliGRAM(s) IV Push every 4 hours PRN Nausea and/or Vomiting  ondansetron Injectable 4 milliGRAM(s) IV Push once PRN Nausea and/or Vomiting  oxyCODONE    IR 10 milliGRAM(s) Oral every 4 hours PRN Moderate Pain (4 - 6)    Allergies    Cipro (Unknown)  penicillin (Unknown)  penicillins (Unknown)  sulfa drugs (Unknown)    Intolerances      Social: no smoking, no alcohol, no illegal drugs; no recent travel, no exposure to TB  FAMILY HISTORY:  Family history of heart disease (Father)     no history of premature cardiovascular disease in first degree relatives  ROS: unable to obtain secondary to patient medical condition     Vital Signs Last 24 Hrs  T(C): 37.2 (02 Mar 2020 12:00), Max: 37.4 (01 Mar 2020 22:54)  T(F): 98.9 (02 Mar 2020 12:00), Max: 99.3 (01 Mar 2020 22:54)  HR: 82 (02 Mar 2020 15:00) (65 - 103)  BP: 141/76 (02 Mar 2020 15:00) (96/58 - 176/97)  BP(mean): 92 (02 Mar 2020 15:00) (66 - 97)  RR: 14 (02 Mar 2020 15:00) (13 - 24)  SpO2: 94% (02 Mar 2020 15:00) (94% - 99%)  Daily Height in cm: 165.1 (01 Mar 2020 22:54)    Daily     PE:    Constitutional: frail looking  HEENT: NC/dressing on top of head with LISA drain in place, EOMI, PERRLA, conjunctivae clear; ears and nose atraumatic; pharynx clear  Neck: supple; thyroid not palpable  Back: no tenderness  Respiratory: respiratory effort normal; clear to auscultation  Cardiovascular: S1S2 regular, no murmurs  Abdomen: soft, not tender, not distended, positive BS; no liver or spleen organomegaly  Genitourinary: no suprapubic tenderness  Musculoskeletal: no muscle tenderness, no joint swelling or tenderness  Neurological/ Psychiatric:  moving all extremities  Skin: no rashes; no palpable lesions    Labs: all available labs reviewed                        12.9   13.88 )-----------( 345      ( 02 Mar 2020 06:09 )             40.2     03-02    139  |  108  |  15  ----------------------------<  120<H>  3.9   |  26  |  0.66    Ca    8.4<L>      02 Mar 2020 06:09  Phos  3.4     03-02  Mg     2.1     03-02    TPro  6.9  /  Alb  3.4  /  TBili  1.2  /  DBili  x   /  AST  10<L>  /  ALT  18  /  AlkPhos  88  03-01     LIVER FUNCTIONS - ( 01 Mar 2020 23:32 )  Alb: 3.4 g/dL / Pro: 6.9 gm/dL / ALK PHOS: 88 U/L / ALT: 18 U/L / AST: 10 U/L / GGT: x             < from: CT Head No Cont (03.02.20 @ 06:52) >    EXAM:  CT BRAIN                            PROCEDURE DATE:  03/02/2020          INTERPRETATION:      PROCEDURE INFORMATION:   Exam: CT Head Without Contrast   Exam date and time: 3/2/2020 6:27 AM   Age: 69 years old   Clinical indication: Other: . ; Prior surgery; Surgery date: Post-operative   (0-2 days); Surgery type: Sdh evacuation; Patient HX: Fall 1 week ago; Ich on   head CT     TECHNIQUE:   Imaging protocol: Computed tomography of the head without contrast.     COMPARISON:   CT ANGIO BRAIN WITHOUT AND OR WITH IV CONTRAST 3/1/2020 11:18 PM     FINDINGS:   Tubes, catheters and devices: There is a drain in the right frontal extra-axial   region   Brain: Interval appearance of postsurgical changes with right frontal   craniotomy and partial evaluation of underlying right-sided subdural hematoma   with some residual subdural hemorrhage in the right temporoparietal region   measuring up to 7 mm in thickness. Large amount of intracranial extra-axial air   is appearing measuring up to 1.8 cm in thickness in the right frontal region   and 7 mm in thickness in the left frontal region.   Midline shift: There is significant right to left midline shift measuring up to   11 mm which is slightly decreased since previous exam where it measured 14 mm.   Ventricles: Mass effect on the ventricular system secondary to the right-sided   air and residual hemorrhage.   Bones/joints: Right frontal craniotomy.   Sinuses: Opacification of the left maxillary sinus.   Mastoid air cells: Visualized mastoid air cells are well aerated.   Soft tissues: Postsurgical soft tissue changes over the right calvarium.     IMPRESSION:   1. Interval appearance of postsurgical changes with right frontal craniotomy   and partial evaluation of underlying right-sided subdural hematoma with some   residual subdural hemorrhage in the right temporoparietal region measuring up   to 7 mm in thickness. Large amount of intracranial extra-axial air is appearing   measuring up to 1.8 cm in thickness in the right frontal region and 7 mm in   thickness in the left frontal region.   2. There is significant right to left midline shift measuring up to 11 mm which   is slightly decreased since previous exam where it measured 14 mm.   3. Remainder of findings as described above.      < end of copied text >      Radiology: all available radiological tests reviewed    Advanced directives addressed: full resuscitation

## 2020-03-02 NOTE — PHYSICAL THERAPY INITIAL EVALUATION ADULT - PERSONAL SAFETY AND JUDGMENT, REHAB EVAL
Pt not realizing her limitations at this time and just seems to want to move quickly/at risk behaviors demonstrated

## 2020-03-02 NOTE — H&P ADULT - NSHPSOCIALHISTORY_GEN_ALL_CORE
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

## 2020-03-02 NOTE — PHYSICAL THERAPY INITIAL EVALUATION ADULT - GENERAL OBSERVATIONS, REHAB EVAL
Pt received in ICU, sitting in recliner chair, +ICU monitors, R cranial bandage c/d/i, VSS for session, NC+ 2L, no c/o pain, Pt alert to her name and place but confused about the situation and how she got to the hospital and didn't know she had surgery this morning. Pt unable to state the name of the president or one of her horses.

## 2020-03-02 NOTE — PHYSICAL THERAPY INITIAL EVALUATION ADULT - LIVES WITH, PROFILE
alone/Pt states she lives alone in a house w/ a full flight of stairs to enter and a full flight in the house to bedroom and bathroom

## 2020-03-02 NOTE — CONSULT NOTE ADULT - SUBJECTIVE AND OBJECTIVE BOX
Patient is a 69 year old female with PMHx of chronic sinusitis s/p sinus surgery in Dr. Soler 10 days ago presents with sudden onset of right sided weakness for 1 day. Patient lives alone at home. Patient's sister tried to call her for 7 hours today but patient did not  her phone. Sister went to her house to check on her and found patient to be leaned over on her right side, non-verbal, laying on a recliner chair. Time of onset is unknown. Patient is not able to give history, obtained from sister at bedside      PRIMARY SURVEY:   A - airway intact  B - bilateral breath sounds and good chest rise  C - initial BP  BP: 176/97 (03-02-20 @ 00:28) *** , HR HR: 103 (03-02-20 @ 00:28) *** , palpable pulses in all extremities  D - GCS 15 on arrival. E 4, V 5, M 6.       SECONDARY SURVEY:  General: NAD  HEENT: Normocephalic, atraumatic, EOMI, PEERLA  NEURO: RUE/RLE 3/5 motor strenght. Sensation intact, Right sided weakness noted in upper and lower.   Left Side 4/5 UE,+ LE. No tounge deviation.   Neck: Soft, midline trachea  Chest: No chest wall tenderness  Cardiac: S1, S2, RRR  Respiratory: Bilateral breath sounds clear and equal   Abdomen: Soft, non-distended, non-tender; no rebound or guarding; no palpable masses   Groin: Normal appearing  Extremities: Palpable radial & DP pulses bilaterally. .  Back: No TTP; no palpable runoff/stepoff/deformity        FAMILY HISTORY:  : Non-contributory, as reviewed with the patient and family.    SOCIAL HISTORY:  ***  Vaccinations up-to-date.     ALLERGIES: penicillin (Unknown)  penicillins (Unknown)      HOME MEDICATIONS:  Home Medications:      CURRENT MEDICATIONS  MEDICATIONS:  ---NEURO-  levETIRAcetam  IVPB 750 milliGRAM(s) IV Intermittent every 12 hours  ---CV-  labetalol Injectable 10 milliGRAM(s) IV Push every 1 hour PRN  ---PULM-  ---GI/FEN-  potassium chloride  10 mEq/100 mL IVPB 10 milliEquivalent(s) IV Intermittent every 1 hour  sodium chloride 0.9%. 1000 milliLiter(s) IV Continuous <Continuous>  ----  ---ID-   vancomycin  IVPB      vancomycin  IVPB 1000 milliGRAM(s) IV Intermittent once  vancomycin  IVPB 1000 milliGRAM(s) IV Intermittent every 12 hours  ---ENDO-  ---HEME-  ---OTHER-        --------------------------------------------------------------------------------------------    VITALS:   T(C): 37.4 (03-01-20 @ 22:54), Max: 37.4 (03-01-20 @ 22:54)  HR: 103 (03-02-20 @ 00:28) (77 - 103)  BP: 176/97 (03-02-20 @ 00:28) (146/85 - 176/97)  RR: 16 (03-02-20 @ 00:28) (15 - 16)  SpO2: 99% (03-02-20 @ 00:28) (98% - 99%)  CAPILLARY BLOOD GLUCOSE        CAPILLARY BLOOD GLUCOSE          Height (cm): 165.1 (03-01 @ 22:54)  Weight (kg): 72.6 (03-01 @ 22:54)  BMI (kg/m2): 26.6 (03-01 @ 22:54)  BSA (m2): 1.8 (03-01 @ 22:54)    --------------------------------------------------------------------------------------------  LABS:  CBC (03-01 @ 23:32)                              13.3                           11.51<H>  )----------------(  367        81.4<H>% Neutrophils, 10.9<L>% Lymphocytes, ANC: 9.37<H>                              41.0      BMP (03-01 @ 23:32)             137     |  105     |  16    		Ca++ --      Ca 8.7                ---------------------------------( 115<H>		Mg --                 3.6     |  26      |  0.59  			Ph --        LFTs (03-01 @ 23:32)      TPro 6.9 / Alb 3.4 / TBili 1.2 / DBili -- / AST 10<L> / ALT 18 / AlkPhos 88    Coags (03-01 @ 23:32)  aPTT 30.8 / INR 1.08 / PT 12.0    Cardiac Markers (03-01 @ 23:32)     HSTrop: -- / CKMB: -- / CK: 31    ABG (03-01 @ 23:32)      /  /  /  /  / %     Lactate:  1.1

## 2020-03-02 NOTE — H&P ADULT - NSHPLABSRESULTS_GEN_ALL_CORE
Labs:                        13.3   11.51 )-----------( 367      ( 01 Mar 2020 23:32 )             41.0     03-01    137  |  105  |  16  ----------------------------<  115<H>  3.6   |  26  |  0.59    Ca    8.7      01 Mar 2020 23:32    TPro  6.9  /  Alb  3.4  /  TBili  1.2  /  DBili  x   /  AST  10<L>  /  ALT  18  /  AlkPhos  88  03-01    PT/INR - ( 01 Mar 2020 23:32 )   PT: 12.0 sec;   INR: 1.08 ratio    PTT - ( 01 Mar 2020 23:32 )  PTT:30.8 sec    Radiology:  CTH : Right Subacute SDH with midline shift and herniation / brain compression - significant greater than 1.5cm Right to Left shift.  Subacute sdh with acute component identified.

## 2020-03-02 NOTE — H&P ADULT - ASSESSMENT
69F with pmHx of Peptic ulcer, Parathyroid surgery, recent ENT sinus surgery about 10 days ago p/w change in MS and R subacute SDH.    - OR now  - Dr. Cisneros communicating with family plans of care  - Keppra 750 now and then bid  - Vanco on call to OR  - sbp < 160  - HOB 30 degrees  - DVT prophylaxis (mechanical only)  - preop labs obtained / OR team notified. 69F with pmHx of Peptic ulcer, Parathyroid surgery, recent ENT sinus surgery about 10 days ago p/w change in MS and R subacute SDH.    - OR now - Emergent craniotomy to release severe brain compression causing neurological injury and motor weakness / progression to somnolent state.  - Dr. Cisneros communicating with family plans of care  - Keppra 750 now and then bid  - Vanco on call to OR  - sbp < 160, IV labetalol pushes multiple - consider cardene gtt  - HOB 30 degrees  - DVT prophylaxis (mechanical only)  - preop labs obtained / OR team notified.  - -180 POC glucose testing  - CT planned post procedure - consent obtained all risks and benefits d/w family.

## 2020-03-02 NOTE — SWALLOW BEDSIDE ASSESSMENT ADULT - SWALLOW EVAL: CRITERIA FOR SKILLED INTERVENTION MET
PT'S OROPHARYNGEAL SWALLOWING ABILITIES ARE WITHIN FUNCTIONAL PARAMETERS. NO PRIMARY MOTOR SPEECH PATHOLOGY WAS EVIDENT. NO PRIMARY LINGUISTIC PATHOLOGY WAS EVIDENT. GIVEN ABOVE, THIS SERVICE WILL NOT ACTIVELY FOLLOW WHILE IN ACUTE HOSPITAL SETTING. RECONSULT PRN.

## 2020-03-02 NOTE — PHYSICAL THERAPY INITIAL EVALUATION ADULT - SHORT TERM MEMORY, REHAB EVAL
uncertain if Asphasia or memory issue when unable to answer the month, the president or the name of the second of her 2 horses.

## 2020-03-02 NOTE — PROGRESS NOTE ADULT - SUBJECTIVE AND OBJECTIVE BOX
Patient is a 69y old  Female who presents with a chief complaint of Right SDH with shift (02 Mar 2020 00:47)      HPI:  69F with PMHx of  sinus issues, peptic ulcers, glaucoma, hx of parathyroidectomy, hysterectomy, breast cancer with lumpectomy X 2 presents BIB ambulance for AMS and weakness / slumping over in chair when found in her home.  Pt is s/p sinus surgery in Dr. Soler 10 days ago - family reports possible fall post ENT surgery but unsure.  Patient lives alone at home. Patient's sister tried to call her for 7 hours today but patient did not  her phone. Sister went to her house to check on her at 10pm and found patient to be leaned over on her right side, non-verbal, laying on a recliner chair. Time of onset is unknown. Patient is not able to give history, obtained from sister at bedside. Family called 911 as they suspected a stroke. Per sister, she spent time with her this week and patient also mentioned she fell while working in her barn about a week ago.  She was complaining of a dull headache this week.  CTH revealed Right SDH subacute in imaging characteristics.  Family denies blood thinner use or NSAID / ASA use.    3/2 - Pt seen and examined earlier today, in NAD. Appears comfortable, c/o mild incisional pain      MEDICATIONS  (STANDING):  levETIRAcetam 500 milliGRAM(s) Oral every 12 hours  potassium chloride  10 mEq/100 mL IVPB 10 milliEquivalent(s) IV Intermittent every 1 hour  sodium chloride 0.9%. 1000 milliLiter(s) (75 mL/Hr) IV Continuous <Continuous> (02 Mar 2020 00:17)      fentaNYL    Injectable 25 MICROGram(s) IV Push every 5 minutes PRN  folic acid 1 milliGRAM(s) Oral daily  labetalol Injectable 10 milliGRAM(s) IV Push every 1 hour PRN  levETIRAcetam  IVPB 750 milliGRAM(s) IV Intermittent every 12 hours  multivitamin 1 Tablet(s) Oral daily  ondansetron Injectable 4 milliGRAM(s) IV Push every 4 hours PRN  ondansetron Injectable 4 milliGRAM(s) IV Push once PRN  oxyCODONE    IR 10 milliGRAM(s) Oral every 4 hours PRN  pantoprazole    Tablet 40 milliGRAM(s) Oral before breakfast  polyethylene glycol 3350 17 Gram(s) Oral daily  senna 2 Tablet(s) Oral at bedtime  sodium chloride 0.9%. 1000 milliLiter(s) IV Continuous <Continuous>  vancomycin  IVPB 750 milliGRAM(s) IV Intermittent every 12 hours      Vital Signs Last 24 Hrs  T(C): 37.2 (02 Mar 2020 12:00), Max: 37.4 (01 Mar 2020 22:54)  T(F): 98.9 (02 Mar 2020 12:00), Max: 99.3 (01 Mar 2020 22:54)  HR: 83 (02 Mar 2020 13:00) (65 - 103)  BP: 131/68 (02 Mar 2020 13:00) (96/58 - 176/97)  BP(mean): 84 (02 Mar 2020 13:00) (66 - 97)  RR: 17 (02 Mar 2020 13:00) (13 - 24)  SpO2: 95% (02 Mar 2020 13:00) (95% - 99%)      Physical Exam:  Constitutional: Awake / alert  HEENT: PERRLA, EOMI  Neck: Supple  Respiratory: Breath sounds are clear bilaterally  Cardiovascular: S1 and S2, regular rhythm  Gastrointestinal: Soft, NT/ND  Extremities:  no edema  Musculoskeletal: no abnormal movements  Skin: Incision drsg C/D/I, +LISA    Neurological Exam:  HF: A x O x 3, flat affect, speech fluent, no aphasia or paraphasic errors. Naming /repetition intact   CN: PERRL, EOMI, facial sensation normal, no NLFD, tongue midline  Motor: No pronator drift, Strength 5/5 in all 4 ext, normal bulk and tone, no tremor, rigidity or bradykinesia  Sens: Intact to light touch  Reflexes: Symmetric and normal, downgoing toes b/l  Coord:  No FNFA, dysmetria, NORA intact   Gait/Balance: Cannot test                          12.9   13.88 )-----------( 345      ( 02 Mar 2020 06:09 )             40.2    139  |  108  |  15  ----------------------------<  120<H>  3.9   |  26  |  0.66    Ca    8.4<L>      02 Mar 2020 06:09  Phos  3.4     03-02  Mg     2.1     03-02    TPro  6.9  /  Alb  3.4  /  TBili  1.2  /  DBili  x   /  AST  10<L>  /  ALT  18  /  AlkPhos  88  03-01    PT/INR - ( 01 Mar 2020 23:32 )   PT: 12.0 sec;   INR: 1.08 ratio      PTT - ( 01 Mar 2020 23:32 )  PTT:30.8 sec      Radiology:  CT Head No Cont (03.02.20 @ 06:52) >  1. Interval appearance of postsurgical changes with right frontal craniotomy   and partial evaluation of underlying right-sided subdural hematoma with some   residual subdural hemorrhage in the right temporoparietal region measuring up   to 7 mm in thickness. Large amount of intracranial extra-axial air is appearing   measuring up to 1.8 cm in thickness in the right frontal region and 7 mm in   thickness in the left frontal region.   2. There is significant right to left midline shift measuring up to 11 mm which   is slightly decreased since previous exam where it measured 14 mm.

## 2020-03-03 ENCOUNTER — TRANSCRIPTION ENCOUNTER (OUTPATIENT)
Age: 70
End: 2020-03-03

## 2020-03-03 RX ORDER — SIMETHICONE 80 MG/1
80 TABLET, CHEWABLE ORAL EVERY 6 HOURS
Refills: 0 | Status: DISCONTINUED | OUTPATIENT
Start: 2020-03-03 | End: 2020-03-04

## 2020-03-03 RX ORDER — LEVETIRACETAM 250 MG/1
750 TABLET, FILM COATED ORAL
Refills: 0 | Status: DISCONTINUED | OUTPATIENT
Start: 2020-03-03 | End: 2020-03-04

## 2020-03-03 RX ADMIN — Medication 1 MILLIGRAM(S): at 12:38

## 2020-03-03 RX ADMIN — PANTOPRAZOLE SODIUM 40 MILLIGRAM(S): 20 TABLET, DELAYED RELEASE ORAL at 06:09

## 2020-03-03 RX ADMIN — LEVETIRACETAM 750 MILLIGRAM(S): 250 TABLET, FILM COATED ORAL at 18:02

## 2020-03-03 RX ADMIN — Medication 250 MILLIGRAM(S): at 09:05

## 2020-03-03 RX ADMIN — SIMETHICONE 80 MILLIGRAM(S): 80 TABLET, CHEWABLE ORAL at 09:40

## 2020-03-03 RX ADMIN — Medication 650 MILLIGRAM(S): at 12:46

## 2020-03-03 RX ADMIN — Medication 1 TABLET(S): at 12:38

## 2020-03-03 RX ADMIN — FENTANYL CITRATE 25 MICROGRAM(S): 50 INJECTION INTRAVENOUS at 11:35

## 2020-03-03 RX ADMIN — Medication 650 MILLIGRAM(S): at 13:45

## 2020-03-03 RX ADMIN — POLYETHYLENE GLYCOL 3350 17 GRAM(S): 17 POWDER, FOR SOLUTION ORAL at 12:38

## 2020-03-03 RX ADMIN — FENTANYL CITRATE 25 MICROGRAM(S): 50 INJECTION INTRAVENOUS at 12:00

## 2020-03-03 RX ADMIN — Medication 650 MILLIGRAM(S): at 18:29

## 2020-03-03 RX ADMIN — Medication 650 MILLIGRAM(S): at 19:30

## 2020-03-03 RX ADMIN — LEVETIRACETAM 750 MILLIGRAM(S): 250 TABLET, FILM COATED ORAL at 09:47

## 2020-03-03 NOTE — DISCHARGE NOTE NURSING/CASE MANAGEMENT/SOCIAL WORK - NSDCCRNAME_GEN_ALL_CORE_FT
yellow discharge planning folder provided including private hire list, community resources, and home care agency information

## 2020-03-03 NOTE — DISCHARGE NOTE NURSING/CASE MANAGEMENT/SOCIAL WORK - NSCORESITESY/N_GEN_A_CORE_RD
Yes Asc Procedure Text (E): After obtaining clear surgical margins the patient was sent to an ASC for surgical repair.  The patient understands they will receive post-surgical care and follow-up from the ASC physician.

## 2020-03-03 NOTE — PROGRESS NOTE ADULT - ASSESSMENT
69 Female with PMH of Peptic ulcer, Parathyroid surgery, recent ENT sinus surgery about 10 days ago p/w change in MS and R subacute SDH.    Now POD#2 Right frontal crani for evacuation of SDH    - D/c drain  - D/c abx  - Neuros / vitals q 8 hours  - Transfer to floor  - CT head in AM  - Cont Keppra  - SBP goal <140  - Venodynes  - ? d/c home tomorrow    Discussed with Dr Cisneros

## 2020-03-03 NOTE — DISCHARGE NOTE NURSING/CASE MANAGEMENT/SOCIAL WORK - PATIENT PORTAL LINK FT
You can access the FollowMyHealth Patient Portal offered by [facility] by registering at the following website: [email or web address]. By joining [facility]’s FollowMyHealth portal, you will also be able to view your health information using other applications (apps) compatible with our system.

## 2020-03-03 NOTE — PROGRESS NOTE ADULT - SUBJECTIVE AND OBJECTIVE BOX
Patient is a 69y old  Female who presents with a chief complaint of Right SDH with shift (02 Mar 2020 15:41)      HPI:  69F with PMHx of  sinus issues, peptic ulcers, glaucoma, hx of parathyroidectomy, hysterectomy, breast cancer with lumpectomy X 2 presents BIB ambulance for AMS and weakness / slumping over in chair when found in her home.  Pt is s/p sinus surgery in Dr. Soler 10 days ago - family reports possible fall post ENT surgery but unsure.  Patient lives alone at home. Patient's sister tried to call her for 7 hours today but patient did not  her phone. Sister went to her house to check on her at 10pm and found patient to be leaned over on her right side, non-verbal, laying on a recliner chair. Time of onset is unknown. Patient is not able to give history, obtained from sister at bedside. Family called 911 as they suspected a stroke. Per sister, she spent time with her this week and patient also mentioned she fell while working in her barn about a week ago.  She was complaining of a dull headache this week.  CTH revealed Right SDH subacute in imaging characteristics.  Family denies blood thinner use or NSAID / ASA use.    3/2 - Pt seen and examined earlier today, in NAD. Appears comfortable, c/o mild incisional pain    3/3 - More alert / animated today. C/o incisional pain, no HA. Ambulates to bathroom without difficulty. LISA with 150cc output since surgery. Bulb half full currently with what appears to be CSF      MEDICATIONS  (STANDING):  levETIRAcetam 500 milliGRAM(s) Oral every 12 hours  potassium chloride  10 mEq/100 mL IVPB 10 milliEquivalent(s) IV Intermittent every 1 hour  sodium chloride 0.9%. 1000 milliLiter(s) (75 mL/Hr) IV Continuous <Continuous> (02 Mar 2020 00:17)      acetaminophen   Tablet .. 650 milliGRAM(s) Oral every 6 hours PRN  acetaminophen  IVPB .. 1000 milliGRAM(s) IV Intermittent once  fentaNYL    Injectable 25 MICROGram(s) IV Push every 30 minutes PRN  folic acid 1 milliGRAM(s) Oral daily  labetalol Injectable 10 milliGRAM(s) IV Push every 1 hour PRN  levETIRAcetam 750 milliGRAM(s) Oral two times a day  multivitamin 1 Tablet(s) Oral daily  ondansetron Injectable 4 milliGRAM(s) IV Push every 4 hours PRN  ondansetron Injectable 4 milliGRAM(s) IV Push once PRN  oxyCODONE    IR 10 milliGRAM(s) Oral every 4 hours PRN  pantoprazole    Tablet 40 milliGRAM(s) Oral before breakfast  polyethylene glycol 3350 17 Gram(s) Oral daily  senna 2 Tablet(s) Oral at bedtime  simethicone 80 milliGRAM(s) Chew every 6 hours PRN      Vital Signs Last 24 Hrs  T(C): 36.8 (03 Mar 2020 10:00), Max: 37.2 (02 Mar 2020 12:00)  T(F): 98.2 (03 Mar 2020 10:00), Max: 98.9 (02 Mar 2020 12:00)  HR: 88 (03 Mar 2020 11:00) (77 - 94)  BP: 111/56 (03 Mar 2020 11:00) (103/64 - 151/67)  BP(mean): 69 (03 Mar 2020 11:00) (66 - 97)  RR: 17 (03 Mar 2020 11:00) (13 - 19)  SpO2: 99% (03 Mar 2020 11:00) (90% - 99%)    LISA - 150cc / 24 hrs      Physical Exam:  Constitutional: Awake / alert  HEENT: PERRLA, EOMI  Neck: Supple  Respiratory: Breath sounds are clear bilaterally  Cardiovascular: S1 and S2, regular rhythm  Gastrointestinal: Soft, NT/ND  Extremities:  no edema  Musculoskeletal: no abnormal movements  Skin: Incision drsg C/D/I, +LISA    Neurological Exam:  HF: A x O x 3, flat affect, speech fluent, no aphasia or paraphasic errors. Naming /repetition intact   CN: PERRL, EOMI, facial sensation normal, no NLFD, tongue midline  Motor: No pronator drift, Strength 5/5 in all 4 ext, normal bulk and tone, no tremor, rigidity or bradykinesia  Sens: Intact to light touch  Reflexes: Symmetric and normal, downgoing toes b/l  Coord:  No FNFA, dysmetria, NORA intact   Gait/Balance: Cannot test                          12.9   13.88 )-----------( 345      ( 02 Mar 2020 06:09 )             40.2    139  |  108  |  15  ----------------------------<  120<H>  3.9   |  26  |  0.66    Ca    8.4<L>      02 Mar 2020 06:09  Phos  3.4     03-02  Mg     2.1     03-02    TPro  6.9  /  Alb  3.4  /  TBili  1.2  /  DBili  x   /  AST  10<L>  /  ALT  18  /  AlkPhos  88  03-01    PT/INR - ( 01 Mar 2020 23:32 )   PT: 12.0 sec;   INR: 1.08 ratio       PTT - ( 01 Mar 2020 23:32 )  PTT:30.8 sec

## 2020-03-04 ENCOUNTER — TRANSCRIPTION ENCOUNTER (OUTPATIENT)
Age: 70
End: 2020-03-04

## 2020-03-04 VITALS
SYSTOLIC BLOOD PRESSURE: 119 MMHG | RESPIRATION RATE: 15 BRPM | OXYGEN SATURATION: 100 % | DIASTOLIC BLOOD PRESSURE: 60 MMHG | TEMPERATURE: 98 F | HEART RATE: 84 BPM

## 2020-03-04 LAB
ANION GAP SERPL CALC-SCNC: 7 MMOL/L — SIGNIFICANT CHANGE UP (ref 5–17)
BUN SERPL-MCNC: 15 MG/DL — SIGNIFICANT CHANGE UP (ref 7–23)
CALCIUM SERPL-MCNC: 8.9 MG/DL — SIGNIFICANT CHANGE UP (ref 8.5–10.1)
CHLORIDE SERPL-SCNC: 109 MMOL/L — HIGH (ref 96–108)
CO2 SERPL-SCNC: 26 MMOL/L — SIGNIFICANT CHANGE UP (ref 22–31)
CREAT SERPL-MCNC: 0.52 MG/DL — SIGNIFICANT CHANGE UP (ref 0.5–1.3)
GLUCOSE SERPL-MCNC: 101 MG/DL — HIGH (ref 70–99)
HCT VFR BLD CALC: 37.3 % — SIGNIFICANT CHANGE UP (ref 34.5–45)
HGB BLD-MCNC: 12.2 G/DL — SIGNIFICANT CHANGE UP (ref 11.5–15.5)
MCHC RBC-ENTMCNC: 29.6 PG — SIGNIFICANT CHANGE UP (ref 27–34)
MCHC RBC-ENTMCNC: 32.7 GM/DL — SIGNIFICANT CHANGE UP (ref 32–36)
MCV RBC AUTO: 90.5 FL — SIGNIFICANT CHANGE UP (ref 80–100)
PHOSPHATE SERPL-MCNC: 3.1 MG/DL — SIGNIFICANT CHANGE UP (ref 2.5–4.5)
PLATELET # BLD AUTO: 313 K/UL — SIGNIFICANT CHANGE UP (ref 150–400)
POTASSIUM SERPL-MCNC: 3.5 MMOL/L — SIGNIFICANT CHANGE UP (ref 3.5–5.3)
POTASSIUM SERPL-SCNC: 3.5 MMOL/L — SIGNIFICANT CHANGE UP (ref 3.5–5.3)
RBC # BLD: 4.12 M/UL — SIGNIFICANT CHANGE UP (ref 3.8–5.2)
RBC # FLD: 13.4 % — SIGNIFICANT CHANGE UP (ref 10.3–14.5)
SODIUM SERPL-SCNC: 142 MMOL/L — SIGNIFICANT CHANGE UP (ref 135–145)
WBC # BLD: 7.92 K/UL — SIGNIFICANT CHANGE UP (ref 3.8–10.5)
WBC # FLD AUTO: 7.92 K/UL — SIGNIFICANT CHANGE UP (ref 3.8–10.5)

## 2020-03-04 PROCEDURE — 99024 POSTOP FOLLOW-UP VISIT: CPT

## 2020-03-04 PROCEDURE — 70450 CT HEAD/BRAIN W/O DYE: CPT | Mod: 26

## 2020-03-04 RX ORDER — ACETAMINOPHEN 500 MG
2 TABLET ORAL
Qty: 0 | Refills: 0 | DISCHARGE

## 2020-03-04 RX ORDER — OXYCODONE HYDROCHLORIDE 5 MG/1
1 TABLET ORAL
Qty: 30 | Refills: 0
Start: 2020-03-04 | End: 2020-03-08

## 2020-03-04 RX ORDER — LEVETIRACETAM 250 MG/1
1 TABLET, FILM COATED ORAL
Qty: 50 | Refills: 0
Start: 2020-03-04 | End: 2020-03-28

## 2020-03-04 RX ADMIN — LEVETIRACETAM 750 MILLIGRAM(S): 250 TABLET, FILM COATED ORAL at 06:34

## 2020-03-04 RX ADMIN — Medication 1 MILLIGRAM(S): at 13:03

## 2020-03-04 RX ADMIN — PANTOPRAZOLE SODIUM 40 MILLIGRAM(S): 20 TABLET, DELAYED RELEASE ORAL at 06:34

## 2020-03-04 RX ADMIN — Medication 650 MILLIGRAM(S): at 06:48

## 2020-03-04 RX ADMIN — Medication 650 MILLIGRAM(S): at 08:00

## 2020-03-04 RX ADMIN — Medication 1 TABLET(S): at 13:03

## 2020-03-04 NOTE — DISCHARGE NOTE PROVIDER - CARE PROVIDER_API CALL
Haile Cisneros  Neurological Surgery  [address] [place] [address]  Phone: [phone]  Fax: [phone]  Phone: (   )    -  Fax: (   )    -  Follow Up Time: 1 week

## 2020-03-04 NOTE — DISCHARGE NOTE PROVIDER - PROVIDER TOKENS
FREE:[LAST:[Blayne],FIRST:[Haile],PHONE:[(   )    -],FAX:[(   )    -],ADDRESS:[Neurological Surgery  [address] [place] [address]  Phone: [phone]  Fax: [phone]],FOLLOWUP:[1 week]]

## 2020-03-04 NOTE — DISCHARGE NOTE PROVIDER - NSDCMRMEDTOKEN_GEN_ALL_CORE_FT
levETIRAcetam 750 mg oral tablet: 1 tab(s) orally 2 times a day MDD:2  oxyCODONE 10 mg oral tablet: 1 tab(s) orally every 4 hours, As needed, Moderate Pain (4 - 6) MDD:6

## 2020-03-04 NOTE — DISCHARGE NOTE PROVIDER - NSDCACTIVITY_GEN_ALL_CORE
Showering allowed/Walking - Indoors allowed/No heavy lifting/straining/Walking - Outdoors allowed/Stairs allowed

## 2020-03-04 NOTE — PROGRESS NOTE ADULT - SUBJECTIVE AND OBJECTIVE BOX
Neurosurgery:    POD#3 Right SDH subacute evacuation via mini-craniotomy    HPI  69F with PMHx of  sinus issues, peptic ulcers, glaucoma, hx of parathyroidectomy, hysterectomy, breast cancer with lumpectomy X 2 presents BIB ambulance for AMS and weakness / slumping over in chair when found in her home.  Pt is s/p sinus surgery in Dr. Soler 10 days ago - family reports possible fall post ENT surgery but unsure.  Patient lives alone at home. Patient's sister tried to call her for 7 hours today but patient did not  her phone. Sister went to her house to check on her at 10pm and found patient to be leaned over on her right side, non-verbal, laying on a recliner chair. Time of onset is unknown. Patient is not able to give history, obtained from sister at bedside. Family called 911 as they suspected a stroke. Per sister, she spent time with her this week and patient also mentioned she fell while working in her barn about a week ago.  She was complaining of a dull headache this week.  CTH revealed Right SDH subacute in imaging characteristics.  Family denies blood thinner use or NSAID / ASA use.    POD#1  3/2 good evacuation - neuro improved  POD#2  3/3 LISA removed - HA's resolved, on NC CT planned for am of 3/4  POD#3  3/4 CTH complete - stable post op study, less air    MEDICATIONS  (STANDING):  acetaminophen  IVPB .. 1000 milliGRAM(s) IV Intermittent once  folic acid 1 milliGRAM(s) Oral daily  levETIRAcetam 750 milliGRAM(s) Oral two times a day  multivitamin 1 Tablet(s) Oral daily  pantoprazole    Tablet 40 milliGRAM(s) Oral before breakfast  polyethylene glycol 3350 17 Gram(s) Oral daily  senna 2 Tablet(s) Oral at bedtime     Vital Signs Last 24 Hrs  T(C): 36.9 (04 Mar 2020 06:00), Max: 37.2 (03 Mar 2020 18:00)  T(F): 98.4 (04 Mar 2020 06:00), Max: 98.9 (03 Mar 2020 18:00)  HR: 77 (04 Mar 2020 07:00) (68 - 93)  BP: 115/65 (04 Mar 2020 07:00) (88/49 - 131/70)  BP(mean): 78 (04 Mar 2020 07:00) (58 - 86)  RR: 19 (04 Mar 2020 07:00) (14 - 32)  SpO2: 97% (04 Mar 2020 07:00) (93% - 100%)    Labs:                        12.2   7.92  )-----------( 313      ( 04 Mar 2020 06:28 )             37.3     03-04    142  |  109<H>  |  15  ----------------------------<  101<H>  3.5   |  26  |  0.52    Ca    8.9      04 Mar 2020 06:28  Phos  3.1     03-04    03-02 [id number] 5.6    Radiology report:  - CT head: CTH stable, no acute heme s/p drain removal    Physical Exam:  Constitutional: Awake / alert  HEENT: PERRLA, EOMI  Neck: Supple  Respiratory: Breath sounds are clear bilaterally  Cardiovascular: S1 and S2, regular rhythm  Gastrointestinal: Soft, NT/ND  Extremities:  no edema  Vascular: No carotid Bruit  Musculoskeletal: no joint swelling/tenderness, no abnormal movements  Skin: No rashes    Neurological Exam:  HF: A x O x 3, appropriately interactive, normal affect, speech fluent, no aphasia or paraphasic errors. Naming /repetition intact   CN: PERRL, EOMI, VFF, facial sensation normal, no NLFD, tongue midline  Motor: No pronator drift, Strength 5/5 in all 4 ext, normal bulk and tone, no tremor, rigidity or bradykinesia  Sens: Intact to light touch  Reflexes: Symmetric and normal, downgoing toes b/l  Coord:  No FNFA, dysmetria, NORA intact   Gait/Balance: Cannot test    A/P:  69 Female with PMH of Peptic ulcer, Parathyroid surgery, recent ENT sinus surgery about 10 days ago p/w change in MS and R subacute SDH.    Now POD#2 Right frontal crani for evacuation of SDH    - CTH reviewed - stable post op study  - Neuros / vitals q 8 hours  - Cont Keppra  - SBP goal <140 - stable off meds  - Venodynes  - d/c home today    Discussed with Dr Cisneros

## 2020-03-04 NOTE — DISCHARGE NOTE PROVIDER - HOSPITAL COURSE
HPI    69F with PMHx of  sinus issues, peptic ulcers, glaucoma, hx of parathyroidectomy, hysterectomy, breast cancer with lumpectomy X 2 presents BIB ambulance for AMS and weakness / slumping over in chair when found in her home.  Pt is s/p sinus surgery in Dr. Soler 10 days ago - family reports possible fall post ENT surgery but unsure.  Patient lives alone at home. Patient's sister tried to call her for 7 hours today but patient did not  her phone. Sister went to her house to check on her at 10pm and found patient to be leaned over on her right side, non-verbal, laying on a recliner chair. Time of onset is unknown. Patient is not able to give history, obtained from sister at bedside. Family called 911 as they suspected a stroke. Per sister, she spent time with her this week and patient also mentioned she fell while working in her barn about a week ago.  She was complaining of a dull headache this week.  CTH revealed Right SDH subacute in imaging characteristics.  Family denies blood thinner use or NSAID / ASA use.        POD#1  3/2 good evacuation - neuro improved    POD#2  3/3 LISA removed - HA's resolved, on NC CT planned for am of 3/4    POD#3  3/4 CTH complete - stable post op study, less air - d/c home - back to intact neuro status

## 2020-03-04 NOTE — DISCHARGE NOTE PROVIDER - NSDCFUADDAPPT_GEN_ALL_CORE_FT
Please call Dr. Cisneros's office for appointment in 7-10 days    Notify MD if you have any bleeding, fevers, increased headaches, weakness, involuntary shaking

## 2021-08-01 ENCOUNTER — TRANSCRIPTION ENCOUNTER (OUTPATIENT)
Age: 71
End: 2021-08-01

## 2021-09-24 ENCOUNTER — TRANSCRIPTION ENCOUNTER (OUTPATIENT)
Age: 71
End: 2021-09-24

## 2022-12-19 ENCOUNTER — NON-APPOINTMENT (OUTPATIENT)
Age: 72
End: 2022-12-19

## 2023-11-09 ENCOUNTER — NON-APPOINTMENT (OUTPATIENT)
Age: 73
End: 2023-11-09

## 2024-04-01 NOTE — PHYSICAL THERAPY INITIAL EVALUATION ADULT - PERTINENT HX OF CURRENT PROBLEM, REHAB EVAL
Patient being seen in [facility] per [facility].    p/w change in MS and R subacute SDH. Lives alone, found by sister after not answering her phone for 7 hours. Underwent Emergent craniotomy to release severe brain compression causing neurological injury and motor weakness

## 2024-04-18 PROBLEM — J34.9 UNSPECIFIED DISORDER OF NOSE AND NASAL SINUSES: Chronic | Status: ACTIVE | Noted: 2020-03-01

## 2024-05-22 ENCOUNTER — APPOINTMENT (OUTPATIENT)
Dept: PEDIATRIC ALLERGY IMMUNOLOGY | Facility: CLINIC | Age: 74
End: 2024-05-22

## 2024-09-20 NOTE — ED PROVIDER NOTE - BIRTH SEX
Medicare Preventive Visit Patient Instructions  Thank you for completing your Welcome to Medicare Visit or Medicare Annual Wellness Visit today. Your next wellness visit will be due in one year (9/21/2025).  The screening/preventive services that you may require over the next 5-10 years are detailed below. Some tests may not apply to you based off risk factors and/or age. Screening tests ordered at today's visit but not completed yet may show as past due. Also, please note that scanned in results may not display below.  Preventive Screenings:  Service Recommendations Previous Testing/Comments   Colorectal Cancer Screening  * Colonoscopy    * Fecal Occult Blood Test (FOBT)/Fecal Immunochemical Test (FIT)  * Fecal DNA/Cologuard Test  * Flexible Sigmoidoscopy Age: 45-75 years old   Colonoscopy: every 10 years (may be performed more frequently if at higher risk)  OR  FOBT/FIT: every 1 year  OR  Cologuard: every 3 years  OR  Sigmoidoscopy: every 5 years  Screening may be recommended earlier than age 45 if at higher risk for colorectal cancer. Also, an individualized decision between you and your healthcare provider will decide whether screening between the ages of 76-85 would be appropriate. Colonoscopy: 11/30/2021  FOBT/FIT: Not on file  Cologuard: Not on file  Sigmoidoscopy: Not on file          Breast Cancer Screening Age: 40+ years old  Frequency: every 1-2 years  Not required if history of left and right mastectomy Mammogram: 06/14/2024        Cervical Cancer Screening Between the ages of 21-29, pap smear recommended once every 3 years.   Between the ages of 30-65, can perform pap smear with HPV co-testing every 5 years.   Recommendations may differ for women with a history of total hysterectomy, cervical cancer, or abnormal pap smears in past. Pap Smear: 10/09/2017        Hepatitis C Screening Once for adults born between 1945 and 1965  More frequently in patients at high risk for Hepatitis C Hep C Antibody:  06/12/2017        Diabetes Screening 1-2 times per year if you're at risk for diabetes or have pre-diabetes Fasting glucose: 90 mg/dL (9/16/2024)  A1C: 5.5 % (9/16/2024)      Cholesterol Screening Once every 5 years if you don't have a lipid disorder. May order more often based on risk factors. Lipid panel: 09/16/2024          Other Preventive Screenings Covered by Medicare:  Abdominal Aortic Aneurysm (AAA) Screening: covered once if your at risk. You're considered to be at risk if you have a family history of AAA.  Lung Cancer Screening: covers low dose CT scan once per year if you meet all of the following conditions: (1) Age 55-77; (2) No signs or symptoms of lung cancer; (3) Current smoker or have quit smoking within the last 15 years; (4) You have a tobacco smoking history of at least 20 pack years (packs per day multiplied by number of years you smoked); (5) You get a written order from a healthcare provider.  Glaucoma Screening: covered annually if you're considered high risk: (1) You have diabetes OR (2) Family history of glaucoma OR (3)  aged 50 and older OR (4)  American aged 65 and older  Osteoporosis Screening: covered every 2 years if you meet one of the following conditions: (1) You're estrogen deficient and at risk for osteoporosis based off medical history and other findings; (2) Have a vertebral abnormality; (3) On glucocorticoid therapy for more than 3 months; (4) Have primary hyperparathyroidism; (5) On osteoporosis medications and need to assess response to drug therapy.   Last bone density test (DXA Scan): 04/12/2021.  HIV Screening: covered annually if you're between the age of 15-65. Also covered annually if you are younger than 15 and older than 65 with risk factors for HIV infection. For pregnant patients, it is covered up to 3 times per pregnancy.    Immunizations:  Immunization Recommendations   Influenza Vaccine Annual influenza vaccination during flu season is  recommended for all persons aged >= 6 months who do not have contraindications   Pneumococcal Vaccine   * Pneumococcal conjugate vaccine = PCV13 (Prevnar 13), PCV15 (Vaxneuvance), PCV20 (Prevnar 20)  * Pneumococcal polysaccharide vaccine = PPSV23 (Pneumovax) Adults 19-63 yo with certain risk factors or if 65+ yo  If never received any pneumonia vaccine: recommend Prevnar 20 (PCV20)  Give PCV20 if previously received 1 dose of PCV13 or PPSV23   Hepatitis B Vaccine 3 dose series if at intermediate or high risk (ex: diabetes, end stage renal disease, liver disease)   Respiratory syncytial virus (RSV) Vaccine - COVERED BY MEDICARE PART D  * RSVPreF3 (Arexvy) CDC recommends that adults 60 years of age and older may receive a single dose of RSV vaccine using shared clinical decision-making (SCDM)   Tetanus (Td) Vaccine - COST NOT COVERED BY MEDICARE PART B Following completion of primary series, a booster dose should be given every 10 years to maintain immunity against tetanus. Td may also be given as tetanus wound prophylaxis.   Tdap Vaccine - COST NOT COVERED BY MEDICARE PART B Recommended at least once for all adults. For pregnant patients, recommended with each pregnancy.   Shingles Vaccine (Shingrix) - COST NOT COVERED BY MEDICARE PART B  2 shot series recommended in those 19 years and older who have or will have weakened immune systems or those 50 years and older     Health Maintenance Due:      Topic Date Due   • DXA SCAN  04/12/2023   • Breast Cancer Screening: Mammogram  06/14/2025   • Hepatitis C Screening  Completed   • Colorectal Cancer Screening  Discontinued     Immunizations Due:      Topic Date Due   • Influenza Vaccine (1) 09/01/2024     Advance Directives   What are advance directives?  Advance directives are legal documents that state your wishes and plans for medical care. These plans are made ahead of time in case you lose your ability to make decisions for yourself. Advance directives can apply to  any medical decision, such as the treatments you want, and if you want to donate organs.   What are the types of advance directives?  There are many types of advance directives, and each state has rules about how to use them. You may choose a combination of any of the following:  Living will:  This is a written record of the treatment you want. You can also choose which treatments you do not want, which to limit, and which to stop at a certain time. This includes surgery, medicine, IV fluid, and tube feedings.   Durable power of  for healthcare (DPAHC):  This is a written record that states who you want to make healthcare choices for you when you are unable to make them for yourself. This person, called a proxy, is usually a family member or a friend. You may choose more than 1 proxy.  Do not resuscitate (DNR) order:  A DNR order is used in case your heart stops beating or you stop breathing. It is a request not to have certain forms of treatment, such as CPR. A DNR order may be included in other types of advance directives.  Medical directive:  This covers the care that you want if you are in a coma, near death, or unable to make decisions for yourself. You can list the treatments you want for each condition. Treatment may include pain medicine, surgery, blood transfusions, dialysis, IV or tube feedings, and a ventilator (breathing machine).  Values history:  This document has questions about your views, beliefs, and how you feel and think about life. This information can help others choose the care that you would choose.  Why are advance directives important?  An advance directive helps you control your care. Although spoken wishes may be used, it is better to have your wishes written down. Spoken wishes can be misunderstood, or not followed. Treatments may be given even if you do not want them. An advance directive may make it easier for your family to make difficult choices about your care.   Weight  Management   Why it is important to manage your weight:  Being overweight increases your risk of health conditions such as heart disease, high blood pressure, type 2 diabetes, and certain types of cancer. It can also increase your risk for osteoarthritis, sleep apnea, and other respiratory problems. Aim for a slow, steady weight loss. Even a small amount of weight loss can lower your risk of health problems.  How to lose weight safely:  A safe and healthy way to lose weight is to eat fewer calories and get regular exercise. You can lose up about 1 pound a week by decreasing the number of calories you eat by 500 calories each day.   Healthy meal plan for weight management:  A healthy meal plan includes a variety of foods, contains fewer calories, and helps you stay healthy. A healthy meal plan includes the following:  Eat whole-grain foods more often.  A healthy meal plan should contain fiber. Fiber is the part of grains, fruits, and vegetables that is not broken down by your body. Whole-grain foods are healthy and provide extra fiber in your diet. Some examples of whole-grain foods are whole-wheat breads and pastas, oatmeal, brown rice, and bulgur.  Eat a variety of vegetables every day.  Include dark, leafy greens such as spinach, kale, jonelle greens, and mustard greens. Eat yellow and orange vegetables such as carrots, sweet potatoes, and winter squash.   Eat a variety of fruits every day.  Choose fresh or canned fruit (canned in its own juice or light syrup) instead of juice. Fruit juice has very little or no fiber.  Eat low-fat dairy foods.  Drink fat-free (skim) milk or 1% milk. Eat fat-free yogurt and low-fat cottage cheese. Try low-fat cheeses such as mozzarella and other reduced-fat cheeses.  Choose meat and other protein foods that are low in fat.  Choose beans or other legumes such as split peas or lentils. Choose fish, skinless poultry (chicken or turkey), or lean cuts of red meat (beef or pork). Before  you cook meat or poultry, cut off any visible fat.   Use less fat and oil.  Try baking foods instead of frying them. Add less fat, such as margarine, sour cream, regular salad dressing and mayonnaise to foods. Eat fewer high-fat foods. Some examples of high-fat foods include french fries, doughnuts, ice cream, and cakes.  Eat fewer sweets.  Limit foods and drinks that are high in sugar. This includes candy, cookies, regular soda, and sweetened drinks.  Exercise:  Exercise at least 30 minutes per day on most days of the week. Some examples of exercise include walking, biking, dancing, and swimming. You can also fit in more physical activity by taking the stairs instead of the elevator or parking farther away from stores. Ask your healthcare provider about the best exercise plan for you.      © Copyright [employer] 2018 Information is for End User's use only and may not be sold, redistributed or otherwise used for commercial purposes. All illustrations and images included in CareNotes® are the copyrighted property of [employer] or [employer]       Female

## 2024-12-29 ENCOUNTER — NON-APPOINTMENT (OUTPATIENT)
Age: 74
End: 2024-12-29

## 2025-04-28 NOTE — PATIENT PROFILE ADULT - LAST TOBACCO USE (DD-MM-YY)
Date of last visit:  4/4/2025  Date of next visit:  8/1/2025    Requested Prescriptions     Pending Prescriptions Disp Refills    hydroxychloroquine (PLAQUENIL) 200 MG tablet [Pharmacy Med Name: Hydroxychloroquine Sulfate Oral Tablet 200 MG] 90 tablet 1     Sig: TAKE 1 TABLET EVERY DAY     
02-Dec-2009